# Patient Record
Sex: MALE | Race: WHITE | URBAN - METROPOLITAN AREA
[De-identification: names, ages, dates, MRNs, and addresses within clinical notes are randomized per-mention and may not be internally consistent; named-entity substitution may affect disease eponyms.]

---

## 2018-03-02 ENCOUNTER — HISTORICAL (OUTPATIENT)
Dept: SURGERY | Facility: HOSPITAL | Age: 58
End: 2018-03-02

## 2018-03-02 LAB
ABS NEUT (OLG): 5.4 X10(3)/MCL (ref 1.5–6.9)
ALBUMIN SERPL-MCNC: 4.2 GM/DL (ref 3.4–5)
ALBUMIN/GLOB SERPL: 1.2 RATIO
ALP SERPL-CCNC: 101 UNIT/L (ref 30–113)
ALT SERPL-CCNC: 18 UNIT/L (ref 10–45)
APTT PPP: 25.7 SECOND(S) (ref 25–35)
AST SERPL-CCNC: 16 UNIT/L (ref 15–37)
BILIRUB SERPL-MCNC: 0.2 MG/DL (ref 0.1–0.9)
BILIRUBIN DIRECT+TOT PNL SERPL-MCNC: 0.1 MG/DL
BILIRUBIN DIRECT+TOT PNL SERPL-MCNC: 0.1 MG/DL (ref 0–0.3)
BUN SERPL-MCNC: 14 MG/DL (ref 10–20)
CALCIUM SERPL-MCNC: 9 MG/DL (ref 8–10.5)
CHLORIDE SERPL-SCNC: 105 MMOL/L (ref 100–108)
CO2 SERPL-SCNC: 30 MMOL/L (ref 21–35)
CREAT SERPL-MCNC: 0.9 MG/DL (ref 0.7–1.3)
ERYTHROCYTE [DISTWIDTH] IN BLOOD BY AUTOMATED COUNT: 14.1 % (ref 11.5–17)
GLOBULIN SER-MCNC: 3.4 GM/DL
GLUCOSE SERPL-MCNC: 104 MG/DL (ref 75–116)
HCT VFR BLD AUTO: 43.2 % (ref 42–52)
HGB BLD-MCNC: 14.3 GM/DL (ref 14–18)
INR PPP: 1 (ref 0–1.2)
MCH RBC QN AUTO: 30 PG (ref 27–34)
MCHC RBC AUTO-ENTMCNC: 33 GM/DL (ref 31–36)
MCV RBC AUTO: 91 FL (ref 80–99)
PLATELET # BLD AUTO: 278 X10(3)/MCL (ref 140–400)
PMV BLD AUTO: 10.7 FL (ref 6.8–10)
POTASSIUM SERPL-SCNC: 4.7 MMOL/L (ref 3.6–5.2)
PROT SERPL-MCNC: 7.6 GM/DL (ref 6.4–8.2)
PROTHROMBIN TIME: 10.3 SECOND(S) (ref 9–12)
RBC # BLD AUTO: 4.76 X10(6)/MCL (ref 4.7–6.1)
SODIUM SERPL-SCNC: 143 MMOL/L (ref 135–145)
WBC # SPEC AUTO: 10 X10(3)/MCL (ref 4.5–11.5)

## 2018-03-06 ENCOUNTER — HISTORICAL (OUTPATIENT)
Dept: ANESTHESIOLOGY | Facility: HOSPITAL | Age: 58
End: 2018-03-06

## 2018-04-02 ENCOUNTER — HISTORICAL (OUTPATIENT)
Dept: SURGERY | Facility: HOSPITAL | Age: 58
End: 2018-04-02

## 2018-04-02 LAB
AMPHET UR QL SCN: NEGATIVE
APPEARANCE, UA: CLEAR
APTT PPP: 25.2 SECOND(S) (ref 25–35)
BARBITURATE SCN PRESENT UR: NEGATIVE
BENZODIAZ UR QL SCN: NEGATIVE
BILIRUB UR QL STRIP: NEGATIVE
CANNABINOIDS UR QL SCN: NEGATIVE
COCAINE UR QL SCN: NEGATIVE
COLOR UR: YELLOW
GLUCOSE (UA): NEGATIVE
HGB UR QL STRIP: NEGATIVE
INR PPP: 0.9 (ref 0–1.2)
KETONES UR QL STRIP: ABNORMAL
LEUKOCYTE ESTERASE UR QL STRIP: NEGATIVE
MDMA UR QL SCN: NEGATIVE
METHADONE UR QL SCN: NEGATIVE
NITRITE UR QL STRIP: NEGATIVE
OPIATES UR QL SCN: NEGATIVE
PCP UR QL: NEGATIVE
PH UR STRIP.AUTO: 6 [PH] (ref 5–8)
PH UR STRIP: 6 [PH]
PROT UR QL STRIP: NEGATIVE
PROTHROMBIN TIME: 9.9 SECOND(S) (ref 9–12)
SP GR UR STRIP: 1.02
TEMPERATURE, URINE (OHS): 22.8 DEGC (ref 20–25)
UROBILINOGEN UR STRIP-ACNC: 0.2 EU/DL

## 2018-04-03 ENCOUNTER — HISTORICAL (OUTPATIENT)
Dept: ANESTHESIOLOGY | Facility: HOSPITAL | Age: 58
End: 2018-04-03

## 2018-04-03 LAB
AMPHET UR QL SCN: NORMAL
BARBITURATE SCN PRESENT UR: NORMAL
BENZODIAZ UR QL SCN: NORMAL
CANNABINOIDS UR QL SCN: NORMAL
COCAINE UR QL SCN: NORMAL
MDMA UR QL SCN: NORMAL
METHADONE UR QL SCN: NORMAL
OPIATES UR QL SCN: NORMAL
PCP UR QL: NORMAL
PH UR STRIP.AUTO: 6 [PH] (ref 5–8)
TEMPERATURE, URINE (OHS): 25 DEGC (ref 20–25)

## 2018-08-03 LAB
APPEARANCE, UA: CLEAR
BILIRUB UR QL STRIP: NEGATIVE
COLOR UR: NORMAL
GLUCOSE (UA): NEGATIVE
HGB UR QL STRIP: NEGATIVE
KETONES UR QL STRIP: NEGATIVE
LEUKOCYTE ESTERASE UR QL STRIP: NEGATIVE
NITRITE UR QL STRIP: NEGATIVE
PH UR STRIP: 5.5 [PH]
PROT UR QL STRIP: NEGATIVE
SP GR UR STRIP: <=1.005
UROBILINOGEN UR STRIP-ACNC: 0.2 EU/DL

## 2018-08-07 ENCOUNTER — HISTORICAL (OUTPATIENT)
Dept: ANESTHESIOLOGY | Facility: HOSPITAL | Age: 58
End: 2018-08-07

## 2022-04-12 ENCOUNTER — HISTORICAL (OUTPATIENT)
Dept: ADMINISTRATIVE | Facility: HOSPITAL | Age: 62
End: 2022-04-12

## 2022-04-30 VITALS
HEIGHT: 67 IN | DIASTOLIC BLOOD PRESSURE: 86 MMHG | BODY MASS INDEX: 31.87 KG/M2 | OXYGEN SATURATION: 98 % | SYSTOLIC BLOOD PRESSURE: 127 MMHG | WEIGHT: 203.06 LBS

## 2022-04-30 NOTE — OP NOTE
Patient:   Pelon Rai            MRN: [id number]            FIN: [id number]               Age:   57 years     Sex:  Male     :  1960   Associated Diagnoses:   Encounter for screening colonoscopy   Author:   Ekta Fuller MD      Operative Note   Operative Information   Date/ Time:  3/6/2018 10:51:00.     Procedures Performed: Procedure Code   Colonoscopy, flexible; diagnostic, including collection of specimen(s) by brushing or washing, when performed (separate procedure) (47723)..     Indications: 57-year-old white male needed for his age-appropriate screening colonoscopy..     Preoperative Diagnosis: Encounter for screening colonoscopy (CFG26-ZB Z12.11).     Postoperative Diagnosis: Encounter for screening colonoscopy (QUW24-BW Z12.11).     Surgeon: Ekta Fuller MD.     Anesthesia: intravenous Mac.     Speciman Removed: none.     Description of Procedure/Findings/    Complications: procedure in detail: Patient was brought to the endoscopy suite laid in the left lateral decubitus position right side up. Intravenous anesthesia was provided. Digital rectal exam performed exhibiting good anal rectal tone and no masses. Endoscope was then passed through the anus into main the rectovaginal gentle insufflation reaching the cecum. Upon reaching the cecum pictures were taken. Scope was slowly withdrawn with evaluation of the mucosa. Overall the cecum ascending transverse descending and rectosigmoid colon all appear to be grossly normal. Upon reaching the distal rectum the scope was retroflexed revealing grade 1 internal hemorrhoids without signs thrombosis ulceration or bleeding. Scope was then returned to neutral position the colon was evacuated of air. The patient was relieved anesthesia stable condition and transferred to postanesthesia care unit..     Esimated blood loss: loss  1  cc.     Findings: overall normal-appearing colonoscopy.     Complications: None.     Notes: repeat colonoscopy  in 7-10 years or sooner GI dysfunction occurs prior to that time.

## 2022-04-30 NOTE — OP NOTE
Patient:   Pelon Rai            MRN: [id number]            FIN: [id number]               Age:   57 years     Sex:  Male     :  1960   Associated Diagnoses:   Right inguinal hernia   Author:   Ekta Fuller MD      Operative Note   Operative Information   Date/ Time:  2018 08:45:00.     Procedures Performed: Procedure Code   Laparoscopy, surgical; repair initial inguinal hernia (57735)..     Indications: 57-year-old white male with complaint of right groin fullness and discomfort associated with activity and clinical evidence of inguinal hernia electing to undergo laparoscopic inguinal hernia repair with evaluation of left side as well.     Preoperative Diagnosis: Right inguinal hernia (MMR80-LX K40.90).     Postoperative Diagnosis: Right inguinal hernia (KDD45-GF K40.90).     Surgeon: Ekta Fuller MD.     Anesthesia: Gen..     Speciman Removed: none.     Description of Procedure/Findings/    Complications: Procedure in detail:   Patient was brought to the operating theater laid in the supine position general endotracheal intubation anesthesia was performed.  Horowitz catheter was then placed.  The abdomen from bilateral nipples down to bilateral groins were sterilely prepped and draped in normal surgical fashion.  1% lidocaine and epinephrine were used to infiltrate the infraumbilical site and a 15 blade was used to excise incise the skin with dissection down to the fascia.  The abdomen was entered by Veress needle technique without difficulty.  The abdomen was then insufflated 15 mmHg.  a 5 mm Visiport trocar was then introduced at the umbilical site.  entering the abdomen carefully no organs were injured during advancement.   Two 8 mm trochars were then placed in bilateral quadrants adjacent to 5mm port approximately 10 cm from the midline under direct visualization.  a 5 mm Visiport trocar was then exchanged for an 8 mm robotic trocar.  The patient was then placed in slight  Trendelenburg position and easy identification of the right and left inguinal regions were evaluated.  There was noted to be a large direct right inguinal hernia and no identifiable left inguinal hernia apparent.  The laproscopic robot was then brought into position overlying the right side of the patient.  The robotic arms were then securely fashion to the port sites and two robotic instruments were introduced into the abdomen under direct visualization.  All appropriate internal anatomy was identified and the peritoneum was incised using endoscopic db approximately 8 cm above the superior edge of the identified right indirect inguinal hernia.  The dissection was carried from a level of the ASIS to the median umbilical ligament.  A large peritoneal flap was then mobilized inferiorly using blunt and sharp dissection.  The inferior epigastric vessels were exposed and the pubic symphysis was identified medially.  Cristi's ligament was then dissected was junction with the iliac vein.  Dissection was continued inferiorly to the iliopubic track care was taken to avoid injury to the femoral branch of the genitofemoral nerve and the lateral femoral cutaneous nerve.  All the cord structures were then peritonealized after reducing a substantially large peritoneal sac from within the right inguinal canal.  It was then gently dissected from the cord structures and reduced back into the peritoneal cavity.  After completion of the lateral dissection taking care to note all sensory nerves I large piece of composite mesh was then introduced into the abdomen.  It was then worked into position in the preperitoneal space and unrolled to completely cover the indirect direct and femoral canal spaces.  This mesh was a self adherent mesh made by covidein not requiring suturing or staples.  The peritoneal flap was then closed over the mesh making sure not to have rolled edges and reapproximated using monofilament suture in a running  locking fashion.  Suture was then removed from the abdomen under direct visualization and the ports were removed from the abdomen under direct visualization.  At this point the abdomen was allowed to exsufflated and the instruments were removed.  The patient was returned to normal resting supine position.  The port sites were then closed.  The infraumbilical fascia was reapproximated using 0 Vicryl in figure-of-eight fashion.  And the skin was reapproximated using running 4-0 subcuticular Monocryl.  The patient was relieved anesthesia in a stable condition and transferred to postanesthesia care unit.  All lap initially counts were correct ×3  .     Esimated blood loss: loss  5  cc.     Findings: large right inguinal direct hernia.     Complications: None.      No

## 2024-04-19 ENCOUNTER — HOSPITAL ENCOUNTER (EMERGENCY)
Facility: HOSPITAL | Age: 64
Discharge: PSYCHIATRIC OR SUBSTANCE USE FACILITY | End: 2024-04-19
Attending: EMERGENCY MEDICINE
Payer: MEDICAID

## 2024-04-19 VITALS
TEMPERATURE: 98 F | SYSTOLIC BLOOD PRESSURE: 145 MMHG | WEIGHT: 176.38 LBS | RESPIRATION RATE: 18 BRPM | DIASTOLIC BLOOD PRESSURE: 86 MMHG | OXYGEN SATURATION: 97 % | HEART RATE: 90 BPM | BODY MASS INDEX: 27.65 KG/M2

## 2024-04-19 DIAGNOSIS — R45.851 SUICIDAL IDEATION: Primary | ICD-10-CM

## 2024-04-19 LAB
ALBUMIN SERPL-MCNC: 4 G/DL (ref 3.4–4.8)
ALBUMIN/GLOB SERPL: 1.2 RATIO (ref 1.1–2)
ALP SERPL-CCNC: 109 UNIT/L (ref 40–150)
ALT SERPL-CCNC: 9 UNIT/L (ref 0–55)
AMPHET UR QL SCN: POSITIVE
APAP SERPL-MCNC: <17.4 UG/ML (ref 17.4–30)
APPEARANCE UR: CLEAR
AST SERPL-CCNC: 16 UNIT/L (ref 5–34)
BACTERIA #/AREA URNS AUTO: ABNORMAL /HPF
BARBITURATE SCN PRESENT UR: NEGATIVE
BASOPHILS # BLD AUTO: 0.07 X10(3)/MCL
BASOPHILS NFR BLD AUTO: 0.7 %
BENZODIAZ UR QL SCN: NEGATIVE
BILIRUB SERPL-MCNC: 0.4 MG/DL
BILIRUB UR QL STRIP.AUTO: NEGATIVE
BUN SERPL-MCNC: 22.7 MG/DL (ref 8.4–25.7)
CALCIUM SERPL-MCNC: 9.9 MG/DL (ref 8.8–10)
CANNABINOIDS UR QL SCN: NEGATIVE
CHLORIDE SERPL-SCNC: 109 MMOL/L (ref 98–107)
CO2 SERPL-SCNC: 24 MMOL/L (ref 23–31)
COCAINE UR QL SCN: NEGATIVE
COLOR UR AUTO: ABNORMAL
CREAT SERPL-MCNC: 1.1 MG/DL (ref 0.73–1.18)
EOSINOPHIL # BLD AUTO: 0.27 X10(3)/MCL (ref 0–0.9)
EOSINOPHIL NFR BLD AUTO: 2.6 %
ERYTHROCYTE [DISTWIDTH] IN BLOOD BY AUTOMATED COUNT: 14.4 % (ref 11.5–17)
ETHANOL SERPL-MCNC: <10 MG/DL
FENTANYL UR QL SCN: NEGATIVE
GFR SERPLBLD CREATININE-BSD FMLA CKD-EPI: >60 MLS/MIN/1.73/M2
GLOBULIN SER-MCNC: 3.3 GM/DL (ref 2.4–3.5)
GLUCOSE SERPL-MCNC: 90 MG/DL (ref 82–115)
GLUCOSE UR QL STRIP.AUTO: NORMAL
HCT VFR BLD AUTO: 43.8 % (ref 42–52)
HGB BLD-MCNC: 14 G/DL (ref 14–18)
HYALINE CASTS #/AREA URNS LPF: ABNORMAL /LPF
IMM GRANULOCYTES # BLD AUTO: 0.04 X10(3)/MCL (ref 0–0.04)
IMM GRANULOCYTES NFR BLD AUTO: 0.4 %
KETONES UR QL STRIP.AUTO: ABNORMAL
LEUKOCYTE ESTERASE UR QL STRIP.AUTO: NEGATIVE
LYMPHOCYTES # BLD AUTO: 1.93 X10(3)/MCL (ref 0.6–4.6)
LYMPHOCYTES NFR BLD AUTO: 18.5 %
MCH RBC QN AUTO: 28.5 PG (ref 27–31)
MCHC RBC AUTO-ENTMCNC: 32 G/DL (ref 33–36)
MCV RBC AUTO: 89.2 FL (ref 80–94)
MDMA UR QL SCN: NEGATIVE
MONOCYTES # BLD AUTO: 0.87 X10(3)/MCL (ref 0.1–1.3)
MONOCYTES NFR BLD AUTO: 8.3 %
MUCOUS THREADS URNS QL MICRO: ABNORMAL /LPF
NEUTROPHILS # BLD AUTO: 7.25 X10(3)/MCL (ref 2.1–9.2)
NEUTROPHILS NFR BLD AUTO: 69.5 %
NITRITE UR QL STRIP.AUTO: NEGATIVE
NRBC BLD AUTO-RTO: 0 %
OPIATES UR QL SCN: NEGATIVE
PCP UR QL: NEGATIVE
PH UR STRIP.AUTO: 6.5 [PH]
PH UR: 6.5 [PH] (ref 3–11)
PLATELET # BLD AUTO: 324 X10(3)/MCL (ref 130–400)
PMV BLD AUTO: 11.6 FL (ref 7.4–10.4)
POTASSIUM SERPL-SCNC: 3.7 MMOL/L (ref 3.5–5.1)
PROT SERPL-MCNC: 7.3 GM/DL (ref 5.8–7.6)
PROT UR QL STRIP.AUTO: NEGATIVE
RBC # BLD AUTO: 4.91 X10(6)/MCL (ref 4.7–6.1)
RBC #/AREA URNS AUTO: ABNORMAL /HPF
RBC UR QL AUTO: NEGATIVE
SALICYLATES SERPL-MCNC: <5 MG/DL (ref 15–30)
SARS-COV-2 RDRP RESP QL NAA+PROBE: NEGATIVE
SODIUM SERPL-SCNC: 144 MMOL/L (ref 136–145)
SP GR UR STRIP.AUTO: 1.02 (ref 1–1.03)
SPECIFIC GRAVITY, URINE AUTO (.000) (OHS): 1.02 (ref 1–1.03)
SQUAMOUS #/AREA URNS LPF: ABNORMAL /HPF
UROBILINOGEN UR STRIP-ACNC: NORMAL
WBC # SPEC AUTO: 10.43 X10(3)/MCL (ref 4.5–11.5)
WBC #/AREA URNS AUTO: ABNORMAL /HPF

## 2024-04-19 PROCEDURE — 82077 ASSAY SPEC XCP UR&BREATH IA: CPT | Performed by: EMERGENCY MEDICINE

## 2024-04-19 PROCEDURE — 99285 EMERGENCY DEPT VISIT HI MDM: CPT

## 2024-04-19 PROCEDURE — 80179 DRUG ASSAY SALICYLATE: CPT | Performed by: EMERGENCY MEDICINE

## 2024-04-19 PROCEDURE — 85025 COMPLETE CBC W/AUTO DIFF WBC: CPT | Performed by: EMERGENCY MEDICINE

## 2024-04-19 PROCEDURE — 87635 SARS-COV-2 COVID-19 AMP PRB: CPT | Performed by: EMERGENCY MEDICINE

## 2024-04-19 PROCEDURE — 80143 DRUG ASSAY ACETAMINOPHEN: CPT | Performed by: EMERGENCY MEDICINE

## 2024-04-19 PROCEDURE — 80307 DRUG TEST PRSMV CHEM ANLYZR: CPT | Performed by: EMERGENCY MEDICINE

## 2024-04-19 PROCEDURE — 81001 URINALYSIS AUTO W/SCOPE: CPT | Mod: XB | Performed by: EMERGENCY MEDICINE

## 2024-04-19 PROCEDURE — 80053 COMPREHEN METABOLIC PANEL: CPT | Performed by: EMERGENCY MEDICINE

## 2024-04-19 NOTE — ED NOTES
Patient accepted to [facility] in [place] via Dr. Triplett spoke with Nandini from intake.

## 2024-04-19 NOTE — ED NOTES
Patient is medically cleared, called [facility] and spoke to edouard, let her know patient requests local

## 2024-04-19 NOTE — ED PROVIDER NOTES
Encounter Date: 4/19/2024       History     Chief Complaint   Patient presents with    Psychiatric Evaluation     PT I /AASI W REPORT OF DEPRESSION, STRESS W THOUGHTS OF SI, NO PLAN.  +HI  TOWARD GIRLFRIENDS SON.  +AH -VH.  PT WANDED.      Depression, paranoia, pec;      Mental Health Problem  The primary symptoms include agitation, depressed mood, dysphoric mood, paranoia and suicidal ideas. The primary symptoms do not include aggression, bizarre behavior, delusions, disorganized speech, disorganized thinking, hallucinations, homicidal ideas, negative symptoms, self-injury, somatic symptoms, suicidal threats or suicide attempt. The current episode started several days ago. This is a recurrent problem.   The onset of the illness is precipitated by stressful event. The degree of incapacity that he is experiencing as a consequence of his illness is moderate. Sequelae of the illness include an inability to work, harmed interpersonal relations and an inability to care for self. Additional symptoms of the illness include anhedonia, insomnia, agitation, feelings of worthlessness, attention impairment, distractible and poor judgment. Additional symptoms of the illness do not include hypersomnia, appetite change, unexpected weight change, fatigue, psychomotor retardation, euphoric mood, increased goal-directed activity, flight of ideas, inflated self-esteem, decreased need for sleep, visual change, headaches, abdominal pain or seizures. He admits to suicidal ideas. He does have a plan to attempt suicide. He contemplates harming himself. He has not already injured self. He does not contemplate injuring another person. He has not already  injured another person. Risk factors that are present for mental illness include a history of mental illness and substance abuse.     Review of patient's allergies indicates:  No Known Allergies  No past medical history on file.  No past surgical history on file.  No family history on  file.  Social History     Tobacco Use    Smoking status: Every Day     Types: Cigarettes    Smokeless tobacco: Never   Substance Use Topics    Alcohol use: Not Currently    Drug use: Not Currently     Review of Systems   Constitutional:  Negative for appetite change, fatigue and unexpected weight change.   Gastrointestinal:  Negative for abdominal pain.   Neurological:  Negative for seizures and headaches.   Psychiatric/Behavioral:  Positive for agitation, dysphoric mood, paranoia and suicidal ideas. Negative for hallucinations, homicidal ideas and self-injury. The patient has insomnia.    All other systems reviewed and are negative.      Physical Exam     Initial Vitals [04/19/24 1215]   BP Pulse Resp Temp SpO2   (!) 145/86 90 18 97.9 °F (36.6 °C) 97 %      MAP       --         Physical Exam    Nursing note and vitals reviewed.  Constitutional: He appears well-developed and well-nourished. He is not diaphoretic. No distress.   HENT:   Head: Normocephalic and atraumatic.   Eyes: EOM are normal. Pupils are equal, round, and reactive to light. Right eye exhibits no discharge. Left eye exhibits no discharge.   Neck: Neck supple. No thyromegaly present. No tracheal deviation present. No JVD present.   Normal range of motion.  Cardiovascular:  Normal rate, regular rhythm, normal heart sounds and intact distal pulses.           No murmur heard.  Pulmonary/Chest: Breath sounds normal. No stridor. No respiratory distress. He has no wheezes. He has no rhonchi. He has no rales.   Abdominal: Abdomen is soft. He exhibits no distension. There is no abdominal tenderness. There is no rebound and no guarding.   Musculoskeletal:         General: No tenderness or edema. Normal range of motion.      Cervical back: Normal range of motion and neck supple.     Neurological: He is alert and oriented to person, place, and time. He has normal strength. No cranial nerve deficit. GCS score is 15. GCS eye subscore is 4. GCS verbal subscore is  5. GCS motor subscore is 6.   Skin: Skin is warm and dry. Capillary refill takes less than 2 seconds. No rash and no abscess noted. No erythema. No pallor.   Psychiatric:   Affect labile, mood depressed, suicidal ideation with some planning, mood and impulse control sufficiently impaired the patient is unable to contract for safety;         ED Course   Procedures  Labs Reviewed   DRUG SCREEN, URINE (BEAKER) - Abnormal; Notable for the following components:       Result Value    Amphetamines, Urine Positive (*)     All other components within normal limits    Narrative:     Cut off concentrations:    Amphetamines - 1000 ng/ml  Barbiturates - 200 ng/ml  Benzodiazepine - 200 ng/ml  Cannabinoids (THC) - 50 ng/ml  Cocaine - 300 ng/ml  Fentanyl - 1.0 ng/ml  MDMA - 500 ng/ml  Opiates - 300 ng/ml   Phencyclidine (PCP) - 25 ng/ml    Specimen submitted for drug analysis and tested for pH and specific gravity in order to evaluate sample integrity. Suspect tampering if specific gravity is <1.003 and/or pH is not within the range of 4.5 - 8.0  False negatives may result form substances such as bleach added to urine.  False positives may result for the presence of a substance with similar chemical structure to the drug or its metabolite.    This test provides only a PRELIMINARY analytical test result. A more specific alternate chemical method must be used in order to obtain a confirmed analytical result. Gas chromatography/mass spectrometry (GC/MS) is the preferred confirmatory method. Other chemical confirmation methods are available. Clinical consideration and professional judgement should be applied to any drug of abuse test result, particularly when preliminary positive results are used.    Positive results will be confirmed only at the physicians request. Unconfirmed screening results are to be used only for medical purposes (treatment).        URINALYSIS, REFLEX TO URINE CULTURE - Abnormal; Notable for the following  components:    Ketones, UA Trace (*)     Mucous, UA Trace (*)     Hyaline Casts, UA 0-2 (*)     All other components within normal limits   SALICYLATE LEVEL - Abnormal; Notable for the following components:    Salicylate Level <5.0 (*)     All other components within normal limits   ACETAMINOPHEN LEVEL - Abnormal; Notable for the following components:    Acetaminophen Level <17.4 (*)     All other components within normal limits   COMPREHENSIVE METABOLIC PANEL - Abnormal; Notable for the following components:    Chloride 109 (*)     All other components within normal limits   CBC WITH DIFFERENTIAL - Abnormal; Notable for the following components:    MCHC 32.0 (*)     MPV 11.6 (*)     All other components within normal limits   SARS-COV-2 RNA AMPLIFICATION, QUAL - Normal    Narrative:     The IDNOW COVID-19 assay is a rapid molecular in vitro diagnostic test utilizing an isothermal nucleic acid amplification technology intended for the qualitative detection of nucleic acid from the SARS-CoV-2 viral RNA in direct nasal, nasopharyngeal or throat swabs from individuals who are suspected of COVID-19 by their healthcare provider.   ALCOHOL,MEDICAL (ETHANOL) - Normal   CBC W/ AUTO DIFFERENTIAL    Narrative:     The following orders were created for panel order CBC Auto Differential.  Procedure                               Abnormality         Status                     ---------                               -----------         ------                     CBC with Differential[[id number]]       Abnormal            Final result                 Please view results for these tests on the individual orders.          Imaging Results    None          Medications - No data to display  Medical Decision Making  Patient with features compatible with major depression and suicidal ideation with some planning and access to means for plan with sufficiently impaired judgment and impulse control patient is unable to contract for  safety.    Amount and/or Complexity of Data Reviewed  External Data Reviewed: notes.     Details: As above;  Labs: ordered. Decision-making details documented in ED Course.     Details: As above;  Discussion of management or test interpretation with external provider(s): Formal mental health evaluation;    Risk  Decision regarding hospitalization.  Risk Details: Risk found sufficient to warrant admission for formal mental health evaluation; patient achieves medical clearance and is referred for formal mental health evaluation;               ED Course as of 04/19/24 1617   Fri Apr 19, 2024   1330 Urine toxicology positive for amphetamines; [CT]   1330 Reassuring urinalysis; [CT]   1331 Negative Abbott now; [CT]   1413 Reassuring hemogram ; [CT]   1428 Negative salycylates, negative tylenol, negative ethanol ; [CT]   1429 Reassuring chemistries ; [CT]      ED Course User Index  [CT] Hubert Abernathy MD         Medically cleared for psychiatry placement: 4/19/2024  2:30 PM                   Clinical Impression:  Final diagnoses:  [R45.851] Suicidal ideation (Primary)          ED Disposition Condition    Transfer to Psych Facility Stable          ED Prescriptions    None       Follow-up Information    None          Hubert Abernathy MD  04/19/24 1432       Hubert Abernathy MD  04/19/24 1617